# Patient Record
Sex: MALE | Race: AMERICAN INDIAN OR ALASKA NATIVE
[De-identification: names, ages, dates, MRNs, and addresses within clinical notes are randomized per-mention and may not be internally consistent; named-entity substitution may affect disease eponyms.]

---

## 2019-07-09 ENCOUNTER — TRANSCRIPTION ENCOUNTER (OUTPATIENT)
Age: 24
End: 2019-07-09

## 2023-04-07 PROBLEM — Z00.00 ENCOUNTER FOR PREVENTIVE HEALTH EXAMINATION: Status: ACTIVE | Noted: 2023-04-07

## 2023-04-21 ENCOUNTER — APPOINTMENT (OUTPATIENT)
Dept: OTOLARYNGOLOGY | Facility: CLINIC | Age: 28
End: 2023-04-21
Payer: COMMERCIAL

## 2023-04-21 ENCOUNTER — NON-APPOINTMENT (OUTPATIENT)
Age: 28
End: 2023-04-21

## 2023-04-21 VITALS
HEIGHT: 67 IN | WEIGHT: 145 LBS | DIASTOLIC BLOOD PRESSURE: 77 MMHG | BODY MASS INDEX: 22.76 KG/M2 | HEART RATE: 68 BPM | TEMPERATURE: 98.2 F | SYSTOLIC BLOOD PRESSURE: 131 MMHG

## 2023-04-21 DIAGNOSIS — R09.81 NASAL CONGESTION: ICD-10-CM

## 2023-04-21 DIAGNOSIS — J34.3 HYPERTROPHY OF NASAL TURBINATES: ICD-10-CM

## 2023-04-21 PROCEDURE — 99204 OFFICE O/P NEW MOD 45 MIN: CPT | Mod: 25

## 2023-04-21 PROCEDURE — 31231 NASAL ENDOSCOPY DX: CPT

## 2023-04-21 RX ORDER — FLUTICASONE PROPIONATE 50 UG/1
50 SPRAY, METERED NASAL DAILY
Qty: 3 | Refills: 3 | Status: ACTIVE | COMMUNITY
Start: 2023-04-21 | End: 1900-01-01

## 2023-04-21 NOTE — ASSESSMENT
[FreeTextEntry1] : Fiberoptic evaluation of his nasal cavity showed no evidence of any enlarged adenoids very large turbinates hypertrophied put him on Flonase nasal spray we will follow-up and see us in 2 months.

## 2023-04-21 NOTE — END OF VISIT
[FreeTextEntry3] : I, Dr. Llanes personally performed the evaluation and management (E/M) services including all necessary procedures, for this new patient. That E/M includes conducting the clinically appropriate initial history &/or exam, assessing all conditions, and establishing the plan of care. Today, my KIMBERLY, Amber Gallego, was here to observe &/or participate in the visit & follow plan of care established by me.\par \par \par

## 2023-04-21 NOTE — HISTORY OF PRESENT ILLNESS
[de-identified] : Patient here for evaluate his adenoids told he had large adenoids as a child here to evaluate since he is nasally congested and has a bit of a nasal voice.